# Patient Record
Sex: FEMALE | Race: WHITE | URBAN - METROPOLITAN AREA
[De-identification: names, ages, dates, MRNs, and addresses within clinical notes are randomized per-mention and may not be internally consistent; named-entity substitution may affect disease eponyms.]

---

## 2022-09-23 ENCOUNTER — OFFICE VISIT (OUTPATIENT)
Dept: URBAN - METROPOLITAN AREA CLINIC 63 | Facility: CLINIC | Age: 72
End: 2022-09-23
Payer: MEDICARE

## 2022-09-23 ENCOUNTER — DASHBOARD ENCOUNTERS (OUTPATIENT)
Age: 72
End: 2022-09-23

## 2022-09-23 ENCOUNTER — WEB ENCOUNTER (OUTPATIENT)
Dept: URBAN - METROPOLITAN AREA CLINIC 63 | Facility: CLINIC | Age: 72
End: 2022-09-23

## 2022-09-23 VITALS
HEIGHT: 64 IN | OXYGEN SATURATION: 99 % | DIASTOLIC BLOOD PRESSURE: 92 MMHG | TEMPERATURE: 97.2 F | SYSTOLIC BLOOD PRESSURE: 150 MMHG | BODY MASS INDEX: 27.14 KG/M2 | WEIGHT: 159 LBS | HEART RATE: 62 BPM

## 2022-09-23 DIAGNOSIS — Z12.11 SCREEN FOR COLON CANCER: ICD-10-CM

## 2022-09-23 PROCEDURE — 99203 OFFICE O/P NEW LOW 30 MIN: CPT | Performed by: NURSE PRACTITIONER

## 2022-09-23 RX ORDER — BUPROPION HYDROCHLORIDE 150 MG/1
TABLET, EXTENDED RELEASE ORAL
Qty: 90 TABLET | Status: ACTIVE | COMMUNITY

## 2022-09-23 RX ORDER — NITROFURANTOIN MONOHYDRATE/MACROCRYSTALLINE 25; 75 MG/1; MG/1
TAKE 1 CAPSULE BY MOUTH EVERY 12 HOURS FOR 5 DAYS CAPSULE ORAL
Qty: 10 EACH | Refills: 0 | Status: ACTIVE | COMMUNITY

## 2022-09-23 RX ORDER — CEPHALEXIN 500 MG/1
TAKE 1 CAPSULE BY MOUTH TWICE A DAY CAPSULE ORAL
Qty: 14 EACH | Refills: 0 | Status: ACTIVE | COMMUNITY

## 2022-09-23 RX ORDER — VALACYCLOVIR HYDROCHLORIDE 1 G/1
PLEASE SEE ATTACHED FOR DETAILED DIRECTIONS AS NEEDED TABLET, FILM COATED ORAL
Qty: 20 EACH | Refills: 0 | Status: ACTIVE | COMMUNITY

## 2022-09-23 RX ORDER — ONDANSETRON HYDROCHLORIDE 4 MG/1
1 TABLET TABLET, FILM COATED ORAL
Qty: 2 | Refills: 0 | OUTPATIENT
Start: 2022-09-23

## 2022-09-23 NOTE — HPI-TODAY'S VISIT:
Ms. Edwards is a pleasant 72 year old female evaluated as a new patient in consultation of screening colonoscopy.  No records are available at this time.  Today, she is asymptomatic.  Having BM every 2 days.   Maternal history of colon cancer in her 60's, she passed at age 89.

## 2022-09-30 ENCOUNTER — LAB OUTSIDE AN ENCOUNTER (OUTPATIENT)
Dept: URBAN - METROPOLITAN AREA CLINIC 63 | Facility: CLINIC | Age: 72
End: 2022-09-30

## 2022-10-28 ENCOUNTER — TELEPHONE ENCOUNTER (OUTPATIENT)
Dept: URBAN - METROPOLITAN AREA CLINIC 57 | Facility: CLINIC | Age: 72
End: 2022-10-28

## 2022-11-07 ENCOUNTER — OFFICE VISIT (OUTPATIENT)
Dept: URBAN - METROPOLITAN AREA MEDICAL CENTER 14 | Facility: MEDICAL CENTER | Age: 72
End: 2022-11-07

## 2022-11-14 ENCOUNTER — OFFICE VISIT (OUTPATIENT)
Dept: URBAN - METROPOLITAN AREA MEDICAL CENTER 7 | Facility: MEDICAL CENTER | Age: 72
End: 2022-11-14
Payer: MEDICARE

## 2022-11-14 DIAGNOSIS — K29.00 ACUTE GASTRITIS: ICD-10-CM

## 2022-11-14 DIAGNOSIS — D12.2 ADENOMATOUS POLYP OF ASCENDING COLON: ICD-10-CM

## 2022-11-14 DIAGNOSIS — K44.9 HIATAL HERNIA: ICD-10-CM

## 2022-11-14 DIAGNOSIS — K64.0 GRADE I INTERNAL HEMORRHOIDS: ICD-10-CM

## 2022-11-14 DIAGNOSIS — D12.7 BENIGN NEOPLASM OF RECTOSIGMOID JUNCTION: ICD-10-CM

## 2022-11-14 DIAGNOSIS — Z12.11 SCREEN FOR COLON CANCER: ICD-10-CM

## 2022-11-14 DIAGNOSIS — K21.9 GASTROESOPHAGEAL REFLUX DISEASE WITHOUT ESOPHAGITIS: ICD-10-CM

## 2022-11-14 DIAGNOSIS — K20.90 ESOPHAGITIS: ICD-10-CM

## 2022-11-14 PROCEDURE — 43239 EGD BIOPSY SINGLE/MULTIPLE: CPT | Performed by: INTERNAL MEDICINE

## 2022-11-14 PROCEDURE — 45385 COLONOSCOPY W/LESION REMOVAL: CPT | Performed by: INTERNAL MEDICINE

## 2022-12-02 PROBLEM — 266435005: Status: ACTIVE | Noted: 2022-10-31
